# Patient Record
Sex: FEMALE | Race: BLACK OR AFRICAN AMERICAN | NOT HISPANIC OR LATINO | Employment: UNEMPLOYED | URBAN - NONMETROPOLITAN AREA
[De-identification: names, ages, dates, MRNs, and addresses within clinical notes are randomized per-mention and may not be internally consistent; named-entity substitution may affect disease eponyms.]

---

## 2017-01-24 ENCOUNTER — HOSPITAL ENCOUNTER (EMERGENCY)
Facility: HOSPITAL | Age: 42
Discharge: HOME | End: 2017-01-24
Attending: EMERGENCY MEDICINE | Admitting: EMERGENCY MEDICINE

## 2017-01-24 VITALS
HEIGHT: 67 IN | DIASTOLIC BLOOD PRESSURE: 87 MMHG | HEART RATE: 97 BPM | SYSTOLIC BLOOD PRESSURE: 129 MMHG | OXYGEN SATURATION: 97 % | TEMPERATURE: 98 F | BODY MASS INDEX: 30.29 KG/M2 | WEIGHT: 193 LBS | RESPIRATION RATE: 18 BRPM

## 2017-01-24 DIAGNOSIS — G43.009 MIGRAINE WITHOUT AURA AND WITHOUT STATUS MIGRAINOSUS, NOT INTRACTABLE: Primary | ICD-10-CM

## 2017-01-24 PROCEDURE — 96374 THER/PROPH/DIAG INJ IV PUSH: CPT

## 2017-01-24 PROCEDURE — 99284 EMERGENCY DEPT VISIT MOD MDM: CPT

## 2017-01-24 PROCEDURE — 25010000002 METOCLOPRAMIDE PER 10 MG: Performed by: PHYSICIAN ASSISTANT

## 2017-01-24 PROCEDURE — 96361 HYDRATE IV INFUSION ADD-ON: CPT

## 2017-01-24 PROCEDURE — 96375 TX/PRO/DX INJ NEW DRUG ADDON: CPT

## 2017-01-24 PROCEDURE — 25010000002 DIPHENHYDRAMINE PER 50 MG: Performed by: PHYSICIAN ASSISTANT

## 2017-01-24 PROCEDURE — 25010000002 KETOROLAC TROMETHAMINE PER 15 MG: Performed by: PHYSICIAN ASSISTANT

## 2017-01-24 RX ORDER — TOPIRAMATE 50 MG/1
50 TABLET, FILM COATED ORAL 2 TIMES DAILY
COMMUNITY
End: 2018-09-07

## 2017-01-24 RX ORDER — METOCLOPRAMIDE HYDROCHLORIDE 5 MG/ML
10 INJECTION INTRAMUSCULAR; INTRAVENOUS ONCE
Status: COMPLETED | OUTPATIENT
Start: 2017-01-24 | End: 2017-01-24

## 2017-01-24 RX ORDER — GABAPENTIN 600 MG/1
600 TABLET ORAL 3 TIMES DAILY
COMMUNITY
End: 2017-12-30

## 2017-01-24 RX ORDER — LISINOPRIL 5 MG/1
5 TABLET ORAL DAILY
COMMUNITY

## 2017-01-24 RX ORDER — KETOROLAC TROMETHAMINE 30 MG/ML
30 INJECTION, SOLUTION INTRAMUSCULAR; INTRAVENOUS ONCE
Status: COMPLETED | OUTPATIENT
Start: 2017-01-24 | End: 2017-01-24

## 2017-01-24 RX ORDER — BACLOFEN 10 MG/1
10 TABLET ORAL NIGHTLY
COMMUNITY
End: 2019-01-27 | Stop reason: HOSPADM

## 2017-01-24 RX ORDER — DIPHENHYDRAMINE HYDROCHLORIDE 50 MG/ML
25 INJECTION INTRAMUSCULAR; INTRAVENOUS ONCE
Status: COMPLETED | OUTPATIENT
Start: 2017-01-24 | End: 2017-01-24

## 2017-01-24 RX ORDER — IBUPROFEN 600 MG/1
600 TABLET ORAL 3 TIMES DAILY
COMMUNITY
End: 2017-12-30

## 2017-01-24 RX ORDER — SUMATRIPTAN 50 MG/1
50 TABLET, FILM COATED ORAL
COMMUNITY
End: 2018-03-02

## 2017-01-24 RX ORDER — HYDROCHLOROTHIAZIDE 25 MG/1
20 TABLET ORAL DAILY
COMMUNITY

## 2017-01-24 RX ADMIN — METOCLOPRAMIDE 10 MG: 5 INJECTION, SOLUTION INTRAMUSCULAR; INTRAVENOUS at 12:03

## 2017-01-24 RX ADMIN — DIPHENHYDRAMINE HYDROCHLORIDE 25 MG: 50 INJECTION, SOLUTION INTRAMUSCULAR; INTRAVENOUS at 11:59

## 2017-01-24 RX ADMIN — KETOROLAC TROMETHAMINE 30 MG: 30 INJECTION, SOLUTION INTRAMUSCULAR at 12:02

## 2017-01-24 RX ADMIN — SODIUM CHLORIDE 1000 ML: 9 INJECTION, SOLUTION INTRAVENOUS at 11:58

## 2017-01-24 NOTE — ED PROVIDER NOTES
Subjective   HPI Comments: 41-year-old female presents with migraine headache for 2 days.  She has a history of migraine headaches and takes Imitrex with no relief.  She states this is typical of her migraine headaches including the pain, sensitivity to  light and sound.  Some nausea no vomiting.    Patient is a 41 y.o. female presenting with migraines.   History provided by:  Patient   used: No    Migraine   Pain location:  Generalized  Quality:  Dull  Radiates to:  R neck  Severity currently:  7/10  Severity at highest:  7/10  Onset quality:  Gradual  Duration:  2 days  Timing:  Constant  Progression:  Unchanged  Chronicity:  New  Similar to prior headaches: yes    Context: bright light, emotional stress and loud noise    Relieved by:  Nothing  Worsened by:  Light and sound (Imitrex)  Associated symptoms: nausea and photophobia        Review of Systems   Eyes: Positive for photophobia.   Gastrointestinal: Positive for nausea.   All other systems reviewed and are negative.      Past Medical History   Diagnosis Date   • Arthritis    • Fibromyalgia    • Hypertension    • Migraine        No Known Allergies    Past Surgical History   Procedure Laterality Date   • Knee arthroscopy     • Cholecystectomy     • Hysterectomy         Family History   Problem Relation Age of Onset   • Stroke Father    • Diabetes Father    • Diabetes Paternal Grandmother        Social History     Social History   • Marital status:      Spouse name: N/A   • Number of children: N/A   • Years of education: N/A     Social History Main Topics   • Smoking status: Never Smoker   • Smokeless tobacco: None   • Alcohol use No   • Drug use: No   • Sexual activity: Not Asked     Other Topics Concern   • None     Social History Narrative   • None           Objective   Physical Exam   Constitutional: She is oriented to person, place, and time. She appears well-developed and well-nourished.   HENT:   Head: Normocephalic and  atraumatic.   Eyes: Conjunctivae and EOM are normal. Pupils are equal, round, and reactive to light.   Neck: Normal range of motion. Neck supple.   Cardiovascular: Normal rate and regular rhythm.    Pulmonary/Chest: Effort normal and breath sounds normal.   Abdominal: Soft. Bowel sounds are normal.   Musculoskeletal: Normal range of motion.   Neurological: She is alert and oriented to person, place, and time.   Skin: Skin is warm and dry.   Psychiatric: She has a normal mood and affect. Her behavior is normal. Thought content normal.   Nursing note and vitals reviewed.      Procedures         ED Course  ED Course   Comment By Time   Resting comfortably Len Fritz Jr., PA-C 01/24 1237                  [facility]    Final diagnoses:   Migraine without aura and without status migrainosus, not intractable            Len Fritz Jr., PA-C  01/24/17 1322

## 2017-03-27 ENCOUNTER — APPOINTMENT (OUTPATIENT)
Dept: GENERAL RADIOLOGY | Facility: HOSPITAL | Age: 42
End: 2017-03-27

## 2017-03-27 ENCOUNTER — HOSPITAL ENCOUNTER (EMERGENCY)
Facility: HOSPITAL | Age: 42
Discharge: HOME | End: 2017-03-27
Attending: EMERGENCY MEDICINE | Admitting: EMERGENCY MEDICINE

## 2017-03-27 VITALS
RESPIRATION RATE: 18 BRPM | OXYGEN SATURATION: 99 % | WEIGHT: 176 LBS | TEMPERATURE: 97.6 F | BODY MASS INDEX: 27.62 KG/M2 | DIASTOLIC BLOOD PRESSURE: 93 MMHG | SYSTOLIC BLOOD PRESSURE: 143 MMHG | HEIGHT: 67 IN | HEART RATE: 92 BPM
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
## 2017-03-27 DIAGNOSIS — S50.01XA CONTUSION OF RIGHT ELBOW, INITIAL ENCOUNTER: ICD-10-CM
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
## 2017-03-27 PROCEDURE — 73080 X-RAY EXAM OF ELBOW: CPT

## 2017-03-27 PROCEDURE — 73060 X-RAY EXAM OF HUMERUS: CPT

## 2017-03-27 PROCEDURE — 99283 EMERGENCY DEPT VISIT LOW MDM: CPT

## 2017-03-27 RX ORDER — ACETAMINOPHEN 325 MG/1
650 TABLET ORAL ONCE
Status: COMPLETED | OUTPATIENT
Start: 2017-03-27 | End: 2017-03-27

## 2017-03-27 RX ADMIN — ACETAMINOPHEN 650 MG: 325 TABLET, FILM COATED ORAL at 11:51

## 2017-03-27 NOTE — ED PROVIDER NOTES
Subjective   History of Present Illness   41-year-old female presenting with right elbow pain after fall last night.  Patient states that she slipped in the tub and fell hitting her right elbow.  She did hit her head but did not lose consciousness, nor have any subsequent vomiting, memory loss, changes in vision, weakness or numbness in arms or legs or use of blood thinners.  She does also have pain in her lower back but that is unchanged from her chronic pain.  Denies any weakness or numbness in her right arm or hand.  Denies any other specific symptoms.      Review of Systems   Musculoskeletal: Positive for arthralgias and back pain.   All other systems reviewed and are negative.      Past Medical History:   Diagnosis Date   • Arthritis    • Fibromyalgia    • Hypertension    • Migraine        No Known Allergies    Past Surgical History:   Procedure Laterality Date   • CHOLECYSTECTOMY     • HYSTERECTOMY     • KNEE ARTHROSCOPY         Family History   Problem Relation Age of Onset   • Stroke Father    • Diabetes Father    • Diabetes Paternal Grandmother        Social History     Social History   • Marital status:      Spouse name: N/A   • Number of children: N/A   • Years of education: N/A     Social History Main Topics   • Smoking status: Never Smoker   • Smokeless tobacco: None   • Alcohol use No   • Drug use: No   • Sexual activity: Not Asked     Other Topics Concern   • None     Social History Narrative           Objective   Physical Exam   Constitutional: She is oriented to person, place, and time. She appears well-developed and well-nourished. No distress.   HENT:   Head: Normocephalic.   Mouth/Throat: Oropharynx is clear and moist. No oropharyngeal exudate.   Ecchymosis over right lateral neck which patient states is unrelated.   Eyes: Conjunctivae are normal. No scleral icterus.   Neck: Neck supple. No tracheal deviation present.   Cardiovascular: Normal rate, regular rhythm, normal heart sounds and  intact distal pulses.  Exam reveals no gallop and no friction rub.    No murmur heard.  Pulmonary/Chest: Effort normal and breath sounds normal. No stridor. No respiratory distress. She has no wheezes. She has no rales. She exhibits no tenderness.   Abdominal: Soft. She exhibits no distension and no mass. There is no tenderness. There is no rebound and no guarding. No hernia.   Musculoskeletal: She exhibits tenderness. She exhibits no edema or deformity.   Full range of motion about bilateral upper and lower extremities.  Only evidence of trauma to bilateral upper and lower extremities is ecchymosis over the posterior aspect of the distal humerus on the right.   Neurological: She is alert and oriented to person, place, and time.   Able to make thumbs up, a-ok sign, cross fingers without difficulty. Able to flex and extend across all joints and sensation intact to all aspects of hand. CRF < 2 seconds. 2+ radial pulses bilaterally   Skin: Skin is warm and dry. She is not diaphoretic. No erythema. No pallor.   Psychiatric: She has a normal mood and affect. Her behavior is normal.   Nursing note and vitals reviewed.      Procedures         ED Course  ED Course                  MDM   41-year-old female here after fall with chief complaint of right elbow pain.  Afebrile, vital signs stable.  Only evidence of trauma is ecchymosis to posterior, distal right humerus.  Does not meet criteria to require a CT scan of the brain.  Will get x-rays of the elbow and humerus and give Tylenol for pain control.    11:45 AM Xray (my read) shows no acute fracture. Will discharge home w/ recommendations for tylenol for pain control.     Final diagnoses:   Fall at home, initial encounter   Contusion of right elbow, initial encounter            Basil Christian MD  03/27/17 1149

## 2017-12-07 ENCOUNTER — HOSPITAL ENCOUNTER (EMERGENCY)
Facility: HOSPITAL | Age: 42
Discharge: HOME | End: 2017-12-07
Attending: EMERGENCY MEDICINE | Admitting: EMERGENCY MEDICINE

## 2017-12-07 ENCOUNTER — APPOINTMENT (OUTPATIENT)
Dept: GENERAL RADIOLOGY | Facility: HOSPITAL | Age: 42
End: 2017-12-07

## 2017-12-07 VITALS
HEIGHT: 68 IN | HEART RATE: 94 BPM | BODY MASS INDEX: 26.67 KG/M2 | DIASTOLIC BLOOD PRESSURE: 87 MMHG | SYSTOLIC BLOOD PRESSURE: 155 MMHG | OXYGEN SATURATION: 100 % | RESPIRATION RATE: 18 BRPM | WEIGHT: 176 LBS | TEMPERATURE: 98.7 F

## 2017-12-07 DIAGNOSIS — S60.041A CONTUSION OF RIGHT RING FINGER WITHOUT DAMAGE TO NAIL, INITIAL ENCOUNTER: ICD-10-CM

## 2017-12-07 DIAGNOSIS — S60.031A CONTUSION OF RIGHT MIDDLE FINGER WITHOUT DAMAGE TO NAIL, INITIAL ENCOUNTER: Primary | ICD-10-CM

## 2017-12-07 PROCEDURE — 73140 X-RAY EXAM OF FINGER(S): CPT

## 2017-12-07 PROCEDURE — 99283 EMERGENCY DEPT VISIT LOW MDM: CPT

## 2017-12-07 RX ORDER — IBUPROFEN 800 MG/1
800 TABLET ORAL EVERY 8 HOURS PRN
Qty: 21 TABLET | Refills: 0 | Status: SHIPPED | OUTPATIENT
Start: 2017-12-07 | End: 2018-03-02 | Stop reason: ALTCHOICE

## 2017-12-07 RX ORDER — IBUPROFEN 800 MG/1
800 TABLET ORAL ONCE
Status: COMPLETED | OUTPATIENT
Start: 2017-12-07 | End: 2017-12-07

## 2017-12-07 RX ADMIN — IBUPROFEN 800 MG: 800 TABLET, FILM COATED ORAL at 14:09

## 2017-12-07 NOTE — ED PROVIDER NOTES
Subjective   History of Present Illness  42-year-old female presents today with complaints of pain in the third and fourth fingers on her right hand after dropping a pound frozen hamburger on the hand approximately an hour ago.  He states that the meat started falling and she was afraid it was going to drop on her foot so she was moving her foot and slipped and it ended up hitting her hand.  She has not taken anything for pain.  She has not iced it.  She said she put a cold cloth on it did not seem to make it worse.  She does have some tingling at the distal and of the third finger and the main area of swelling appears to be over the PIP on the third finger.  She denies any other injuries.  Denies any chance she could be pregnant.  Review of Systems   All other systems reviewed and are negative.      Past Medical History:   Diagnosis Date   • Arthritis    • Fibromyalgia    • Hypertension    • Migraine        No Known Allergies    Past Surgical History:   Procedure Laterality Date   • CHOLECYSTECTOMY     • HYSTERECTOMY     • KNEE ARTHROSCOPY         Family History   Problem Relation Age of Onset   • Stroke Father    • Diabetes Father    • Diabetes Paternal Grandmother        Social History     Social History   • Marital status:      Spouse name: N/A   • Number of children: N/A   • Years of education: N/A     Social History Main Topics   • Smoking status: Never Smoker   • Smokeless tobacco: None   • Alcohol use No   • Drug use: No   • Sexual activity: Not Asked     Other Topics Concern   • None     Social History Narrative           Objective   Physical Exam   Constitutional: She is oriented to person, place, and time. She appears well-developed and well-nourished.   HENT:   Head: Normocephalic and atraumatic.   Eyes: Conjunctivae and EOM are normal. Pupils are equal, round, and reactive to light.   Neck: Normal range of motion.   Cardiovascular: Normal rate, regular rhythm and normal heart sounds.     Pulmonary/Chest: Effort normal and breath sounds normal.   Musculoskeletal: Normal range of motion.   She has some swelling over the PIP of the third right finger and also some over the fourth.  She seems to have more of the swelling on the right third finger.  She is able to oppose each finger to the thumb and make an a okay with each finger.  She's having some difficulty bending at the PIP on the third and fourth fingers due to swelling and pain.  She has normal sensation although she does verbalize having some tingling at the distal end of the right third finger.  No injury to the actual hand.   Neurological: She is alert and oriented to person, place, and time.   Skin: Skin is warm and dry.   Psychiatric: She has a normal mood and affect. Her behavior is normal. Judgment and thought content normal.   Nursing note and vitals reviewed.      Procedures         ED Course  ED Course   Comment By Time   X-ray of the third and fourth finger on the right hand are negative for any fractures.  I will order Motrin 800 mg 1 every 8 hours with food for the next week.  I gunjan taped the 2 fingers together.  I recommended she elevate them and ice them.  She'll follow-up with her primary care provider if she has no improvement. Gudelia Barcenas, APRN 12/07 5819        We'll give her Motrin 800 mg.  We'll get an x-ray of the third and fourth fingers on the right hand.          MDM    Final diagnoses:   Contusion of right middle finger without damage to nail, initial encounter   Contusion of right ring finger without damage to nail, initial encounter            Gudelia Barcenas, APRN  12/07/17 [id number]

## 2017-12-29 ENCOUNTER — TRANSCRIBE ORDERS (OUTPATIENT)
Dept: ADMINISTRATIVE | Facility: HOSPITAL | Age: 42
End: 2017-12-29

## 2017-12-29 ENCOUNTER — HOSPITAL ENCOUNTER (OUTPATIENT)
Dept: GENERAL RADIOLOGY | Facility: HOSPITAL | Age: 42
Discharge: HOME | End: 2017-12-29
Admitting: NURSE PRACTITIONER

## 2017-12-29 ENCOUNTER — HOSPITAL ENCOUNTER (OUTPATIENT)
Dept: GENERAL RADIOLOGY | Facility: HOSPITAL | Age: 42
Discharge: HOME | End: 2017-12-29

## 2017-12-29 DIAGNOSIS — R52 PAIN: ICD-10-CM

## 2017-12-29 DIAGNOSIS — R52 PAIN: Primary | ICD-10-CM

## 2017-12-29 PROCEDURE — 72050 X-RAY EXAM NECK SPINE 4/5VWS: CPT

## 2017-12-29 PROCEDURE — 73060 X-RAY EXAM OF HUMERUS: CPT

## 2017-12-29 PROCEDURE — 73030 X-RAY EXAM OF SHOULDER: CPT

## 2018-02-09 ENCOUNTER — TRANSCRIBE ORDERS (OUTPATIENT)
Dept: MRI IMAGING | Facility: HOSPITAL | Age: 43
End: 2018-02-09

## 2018-02-09 DIAGNOSIS — M25.561 ACUTE PAIN OF RIGHT KNEE: Primary | ICD-10-CM

## 2018-02-09 DIAGNOSIS — M54.2 NECK PAIN: Primary | ICD-10-CM

## 2018-02-16 ENCOUNTER — HOSPITAL ENCOUNTER (OUTPATIENT)
Dept: MRI IMAGING | Facility: HOSPITAL | Age: 43
Discharge: HOME | End: 2018-02-16

## 2018-02-16 ENCOUNTER — APPOINTMENT (OUTPATIENT)
Dept: MRI IMAGING | Facility: HOSPITAL | Age: 43
End: 2018-02-16

## 2018-02-22 ENCOUNTER — HOSPITAL ENCOUNTER (OUTPATIENT)
Dept: MRI IMAGING | Facility: HOSPITAL | Age: 43
Discharge: HOME | End: 2018-02-22
Admitting: NURSE PRACTITIONER

## 2018-02-22 ENCOUNTER — HOSPITAL ENCOUNTER (OUTPATIENT)
Dept: MRI IMAGING | Facility: HOSPITAL | Age: 43
Discharge: HOME | End: 2018-02-22

## 2018-02-22 DIAGNOSIS — M54.2 NECK PAIN: ICD-10-CM

## 2018-02-22 DIAGNOSIS — M25.561 ACUTE PAIN OF RIGHT KNEE: ICD-10-CM

## 2018-02-22 PROCEDURE — 72141 MRI NECK SPINE W/O DYE: CPT

## 2018-02-22 PROCEDURE — 73721 MRI JNT OF LWR EXTRE W/O DYE: CPT

## 2018-02-28 DIAGNOSIS — M25.561 RIGHT KNEE PAIN, UNSPECIFIED CHRONICITY: Primary | ICD-10-CM

## 2018-03-02 ENCOUNTER — OFFICE VISIT (OUTPATIENT)
Dept: ORTHOPEDIC SURGERY | Facility: CLINIC | Age: 43
End: 2018-03-02

## 2018-03-02 VITALS — HEIGHT: 68 IN | RESPIRATION RATE: 16 BRPM | WEIGHT: 180 LBS | BODY MASS INDEX: 27.28 KG/M2

## 2018-03-02 DIAGNOSIS — R29.6 FREQUENT FALLS: ICD-10-CM

## 2018-03-02 DIAGNOSIS — M25.361 KNEE INSTABILITY, RIGHT: ICD-10-CM

## 2018-03-02 DIAGNOSIS — M22.41 PATELLA, CHONDROMALACIA, RIGHT: Primary | ICD-10-CM

## 2018-03-02 PROCEDURE — 99203 OFFICE O/P NEW LOW 30 MIN: CPT | Performed by: PHYSICIAN ASSISTANT

## 2018-03-02 RX ORDER — SUMATRIPTAN 100 MG/1
TABLET, FILM COATED ORAL
COMMUNITY
Start: 2018-02-19

## 2018-03-02 RX ORDER — AMITRIPTYLINE HYDROCHLORIDE 10 MG/1
TABLET, FILM COATED ORAL
COMMUNITY
Start: 2018-01-22 | End: 2019-01-27 | Stop reason: HOSPADM

## 2018-03-02 RX ORDER — ROPINIROLE 0.5 MG/1
TABLET, FILM COATED ORAL
COMMUNITY
Start: 2018-02-19 | End: 2018-09-07

## 2018-03-02 RX ORDER — ERGOCALCIFEROL 1.25 MG/1
CAPSULE ORAL
COMMUNITY
Start: 2018-03-01 | End: 2018-09-07

## 2018-03-02 RX ORDER — GABAPENTIN 800 MG/1
TABLET ORAL
COMMUNITY
Start: 2018-02-28 | End: 2019-03-06

## 2018-03-02 RX ORDER — MELOXICAM 15 MG/1
15 TABLET ORAL DAILY
Qty: 30 TABLET | Refills: 1 | Status: SHIPPED | OUTPATIENT
Start: 2018-03-02 | End: 2018-03-27

## 2018-03-02 RX ORDER — LANOLIN ALCOHOL/MO/W.PET/CERES
CREAM (GRAM) TOPICAL
COMMUNITY
Start: 2018-01-31 | End: 2019-01-27 | Stop reason: HOSPADM

## 2018-03-02 RX ORDER — DULOXETIN HYDROCHLORIDE 30 MG/1
CAPSULE, DELAYED RELEASE ORAL
COMMUNITY
Start: 2018-01-29 | End: 2018-03-27

## 2018-03-02 RX ORDER — GABAPENTIN 600 MG/1
TABLET ORAL
COMMUNITY
Start: 2018-01-29 | End: 2018-09-07

## 2018-03-02 NOTE — PROGRESS NOTES
"Subjective   Patient ID: Baldo Purcell is a 42 y.o. right hand dominant female is here today for a treatment planning discussion. Pain of the Right Knee and Pain of the Right Hand         History of Present Illness  Patient presents as a new patient with complaints of right knee pain.  She states she's been dealing with right knee pain for many years.  She states her knee wants to \"give out often\".  She states she has frequent falls due to the knee instability.  The most recent fall was yesterday when she landed directly on her right knee.  Prior to that October 2017 she had a fall for the same reason    Patient states her right knee will swell at times.  She also states her lower back does hurt with radiation of pain from the lower back into the lower legs.  Her primary care provider ordered an MRI of the right knee which she brings with her.  Patient also states she had an EMG of her lower extremities approximate 1 year ago for \"fibromyalgia pains\" she states she was told this was within normal limits.    Pain Score: 9  Pain Location: Hand (& Finger)  Pain Orientation: Right     Pain Descriptors: Aching, Throbbing     Pain Onset: Gradual        Aggravating Factors: Stairs, Walking, Standing        Pain Intervention(s): Heat applied, Cold applied, Rest, Medication (See MAR), Home medication, Warm pack  Result of Injury: No       Past Medical History:   Diagnosis Date   • Arthritis    • Bursitis    • Diverticula, colon    • Fibromyalgia    • GERD (gastroesophageal reflux disease)    • Hypertension    • Lumbosacral disc disease    • Migraine    • Osteoarthritis         Past Surgical History:   Procedure Laterality Date   • CHOLECYSTECTOMY     • HYSTERECTOMY     • KNEE ARTHROSCOPY     • KNEE ARTHROSCOPY Right    • TUBAL ABDOMINAL LIGATION     • TUMOR EXCISION         Family History   Problem Relation Age of Onset   • Stroke Father    • Diabetes Father    • Diabetes Paternal Grandmother    • Osteoarthritis " "Other    • Hypertension Other    • Angina Other    • Diabetes Other    • Stroke Other    • Hyperlipidemia Other    • Glaucoma Other        Social History     Social History   • Marital status:      Spouse name: N/A   • Number of children: N/A   • Years of education: N/A     Occupational History   • Not on file.     Social History Main Topics   • Smoking status: Never Smoker   • Smokeless tobacco: Not on file   • Alcohol use No   • Drug use: No   • Sexual activity: Defer     Other Topics Concern   • Not on file     Social History Narrative       No Known Allergies    Review of Systems   Constitutional: Negative for fever.   HENT: Negative for voice change.    Eyes: Negative for visual disturbance.   Respiratory: Negative for shortness of breath.    Cardiovascular: Negative for chest pain.   Gastrointestinal: Negative for abdominal distention and abdominal pain.   Genitourinary: Negative for dysuria.   Musculoskeletal: Positive for arthralgias. Negative for gait problem and joint swelling.   Skin: Positive for wound (bruising to the right knee). Negative for rash.   Neurological: Negative for speech difficulty.   Hematological: Does not bruise/bleed easily.   Psychiatric/Behavioral: Negative for confusion.   All other systems reviewed and are negative.      Objective   Resp 16  Ht 171.5 cm (67.52\")  Wt 81.6 kg (180 lb)  BMI 27.76 kg/m2   Physical Exam   Constitutional: She is oriented to person, place, and time. She appears well-nourished.   Neck: No tracheal deviation present.   Pulmonary/Chest: Effort normal.   Musculoskeletal:        Right knee: She exhibits no swelling, no effusion, no ecchymosis and no erythema. Tenderness found. Medial joint line and lateral joint line tenderness noted.        Lumbar back: She exhibits tenderness.   Neurological: She is alert and oriented to person, place, and time.   Psychiatric: Her behavior is normal.   Vitals reviewed.    Right Knee Exam     Range of Motion "   Right knee extension: 2.   Flexion: 120     Tests   Saba:  Medial - negative Lateral - negative  Drawer:       Anterior - negative    Posterior - negative  Varus: negative  Valgus: negative  Patellar Apprehension: 1+    Other   Erythema: absent  Sensation: normal  Pulse: present  Swelling: none  Other tests: no effusion present      Back Exam     Muscle Strength   Right Quadriceps:  4/5   Right Hamstrings:  4/5            Extremity DVT signs are Negative by clinical screen. and Negative on physical exam with negative Hattie sign, with no calf pain, with no palpable cords, with no increased pain with passive stretch/extension and with no skin tone change   Neurologic Exam     Mental Status   Oriented to person, place, and time.     Motor Exam     Strength   Right quadriceps: 4/5  Right hamstrin/5     Right Knee Exam     Tenderness   The patient is experiencing tenderness in the medial joint line, lateral joint line.         Patient does have several small superficial bruises to the right knee.  Negative patella tap test.  Patient does have significant patellar crepitus to the right knee.    Assessment/Plan   Independent Review of Radiographic Studies:    No new imaging done today.    I did review an MRI of the right knee.  There is significant patellofemoral chondromalacia  Procedures       Baldo was seen today for pain and pain.    Diagnoses and all orders for this visit:    Patella, chondromalacia, right  -     Ambulatory Referral to Physical Therapy Evaluate and treat, Ortho    Knee instability, right  -     Ambulatory Referral to Physical Therapy Evaluate and treat, Ortho    Frequent falls  -     Ambulatory Referral to Physical Therapy Evaluate and treat, Ortho    Other orders  -     meloxicam (MOBIC) 15 MG tablet; Take 1 tablet by mouth Daily.       Orthopedic activities reviewed and patient expressed appreciation  Discussion of orthopedic goals  Risk, benefits, and merits of treatment alternatives  reviewed with the patient and questions answered  Physical therapy referral given  Use brace as instructed  Ice, heat, and/or modalities as beneficial    Recommendations/Plan:  Exercise, medications, injections, other patient advice, and return appointment as noted.  Patient is encouraged to call or return for any issues or concerns.  Enroll in formal physical therapy for quad and VMO strengthening.  I did order a walker for the patient due to the knee instability and due to her recent fall-I would like to have her follow partial weightbearing to the right knee.  Patient was also provided a patella maltracking knee brace to be worn once the bruises have healed.  FU after 3 months of PT    Patient agreeable to call or return sooner for any concerns.

## 2018-03-12 ENCOUNTER — TREATMENT (OUTPATIENT)
Dept: PHYSICAL THERAPY | Facility: CLINIC | Age: 43
End: 2018-03-12

## 2018-03-12 DIAGNOSIS — M25.561 ACUTE PAIN OF RIGHT KNEE: Primary | ICD-10-CM

## 2018-03-12 PROCEDURE — 97161 PT EVAL LOW COMPLEX 20 MIN: CPT | Performed by: PHYSICAL THERAPIST

## 2018-03-12 NOTE — PROGRESS NOTES
Physical Therapy Initial Evaluation and Plan of Care      Patient: Baldo Purcell   : 1975  Diagnosis/ICD-10 Code:  No primary diagnosis found.  Referring practitioner: SHITAL Louise*    Subjective Evaluation    History of Present Illness  Mechanism of injury: R knee arthroscopy about 13 years ago.      Pt felt like the surgery helped for about 6 months.      Recently (6 months) the knee began to grind and crunch really bad.   She states the knee cap goes out and she reduces it herself.      Trying to get disability       Patient Occupation: trying to get disability.  Pain  Current pain ratin  At best pain ratin  At worst pain rating: 10  Location: retro patellar area.   Quality: sharp and grinding  Aggravating factors: ambulation, stairs, repetitive movement and standing  Progression: worsening    Treatments  Previous treatment: physical therapy (14 years ago. )           Objective       Palpation     Additional Palpation Details  MM tightness noted in the HS and quad.     Tenderness     Right Knee   Tenderness in the inferior patella, lateral patella, medial patella and superior patella.     Active Range of Motion     Right Knee   Flexion: 131 (pain in the knee 8/10) degrees with pain  Extension: 3 degrees with pain    Additional Active Range of Motion Details  Large amount of crepitus noted in the R knee.     Patellar Mobility     Right Knee Hypomobile in the medial, lateral, superior and inferior patellar tendon(s).     Additional Patellar Mobility Details  MM guarding limiting mobility.  Pain also present with testing.       Strength/Myotome Testing     Left Knee   Flexion: 4  Prone flexion: 4  Quadriceps contraction: good    Right Knee   Flexion: 3  Prone flexion: 2+  Quadriceps contraction: poor         Assessment & Plan     Assessment  Impairments: abnormal gait, abnormal muscle tone, abnormal or restricted ROM, activity intolerance, impaired balance, impaired physical  strength, lacks appropriate home exercise program and pain with function  Assessment details: Patient is a 42 year old female who comes to physical therapy with R knee pain. Signs and symptoms are consistent with R patellar dysfunction.  The patient currently has pain, decreased ROM, decreased strength, and inability to perform all essential functional activities. Pt will benefit from skilled PT services to address the above issues.     Prognosis: fair  Prognosis details:   SHORT TERM GOALS:  2 weeks       1. Pt independent with HEP  2. Pt to demonstrate danni hip strength 4/5 or greater to improve stability with ambulation  3. Pt to report being able to walk for 10 minutes without increasing pain in the right knee    LONG TERM GOALS:   6 weeks  1. Pt to demonstrate ability to perform 1/2 functional squat with good form and control of the knees and without increasing pain  2. Pt to demonstrate danni hip strength to 4+/5 or greater to improve safety with ambulation on uneven surfaces  3. Pt to return to work full duty without increased pain in the right knee   4. Pt to demonstrate ability to perform step up/down 8 inch step x10 safely and without pain in the right knee   Functional Limitations: carrying objects, walking, uncomfortable because of pain, standing and unable to perform repetitive tasks  Plan  Therapy options: will be seen for skilled physical therapy services  Planned modality interventions: cryotherapy, electrical stimulation/Russian stimulation, thermotherapy (hydrocollator packs) and ultrasound  Planned therapy interventions: stretching, strengthening, soft tissue mobilization, manual therapy, motor coordination training, neuromuscular re-education, ADL retraining, balance/weight-bearing training, flexibility, functional ROM exercises, gait training and home exercise program  Treatment plan discussed with: patient  Plan details: Pt will be seen 1 times per week for skilled PT.         Manual Therapy:          mins  07019;  Therapeutic Exercise:    25nc     mins  78692;     Neuromuscular Sedrick:        mins  04360;    Therapeutic Activity:          mins  44506;     Gait Training:           mins  34268;     Ultrasound:          mins  83443;    Electrical Stimulation:         mins  36697 ( );  Dry Needling          mins self-pay    Timed Treatment:   25nc   mins   Total Treatment:     50   mins    PT SIGNATURE: Jax Gibson, PT   DATE TREATMENT INITIATED: 3/12/2018    Initial Certification  Certification Period: 6/10/2018  I certify that the therapy services are furnished while this patient is under my care.  The services outlined above are required by this patient, and will be reviewed every 90 days.     PHYSICIAN: Coleman Cardenas PA-C      DATE:     Please sign and return via fax to  .. Thank you, [facility].

## 2018-03-27 ENCOUNTER — OFFICE VISIT (OUTPATIENT)
Dept: NEUROSURGERY | Facility: CLINIC | Age: 43
End: 2018-03-27

## 2018-03-27 VITALS — WEIGHT: 180 LBS | BODY MASS INDEX: 27.28 KG/M2 | HEIGHT: 68 IN

## 2018-03-27 DIAGNOSIS — M50.30 BULGING OF CERVICAL INTERVERTEBRAL DISC: Primary | ICD-10-CM

## 2018-03-27 PROCEDURE — 99243 OFF/OP CNSLTJ NEW/EST LOW 30: CPT | Performed by: NEUROLOGICAL SURGERY

## 2018-03-27 NOTE — PROGRESS NOTES
Subjective   Patient ID: Baldo Purcell is a 42 y.o. female is being seen for consultation today at the request of MÓNICA Pascual  Chief Complaint: Neck and left arm pain    History of Present Illness: The patient is a 42-year-old woman from [facility] with a history of pain in her neck that radiates to her left shoulder and arm and has been present for about the past 6 months.  Pain radiates to her middle and ring finger on the left arm, as well as affecting the left triceps region and elbow region as well as extensor forearm surface.  She has been undergoing physical therapy for her lower extremity, knee, but none on her cervical spine.  She has a history of fibromyalgia and has not been able to work.  She has 2 children at home and 5 stepchildren with her boyfriend.    Review of Radiographic Studies:  Cervical MRI scan on 2/22/18 shows a left sided cervical disc herniation at C6-7 and at C7-T1.  There is a right-sided disc bulge at C5-6.    The following portions of the patient's history were reviewed, updated as appropriate and approved: allergies, current medications, past family history, past medical history, past social history, past surgical history, review of systems and problem list.  Review of Systems   Constitutional: Negative for activity change, appetite change, chills, diaphoresis, fatigue, fever and unexpected weight change.   HENT: Negative for congestion, dental problem, drooling, ear discharge, ear pain, facial swelling, hearing loss, mouth sores, nosebleeds, postnasal drip, rhinorrhea, sinus pressure, sneezing, sore throat, tinnitus, trouble swallowing and voice change.    Eyes: Negative for photophobia, pain, discharge, redness, itching and visual disturbance.   Respiratory: Negative for apnea, cough, choking, chest tightness, shortness of breath, wheezing and stridor.    Cardiovascular: Negative for chest pain, palpitations and leg swelling.   Gastrointestinal: Negative  for abdominal distention, abdominal pain, anal bleeding, blood in stool, constipation, diarrhea, nausea, rectal pain and vomiting.   Endocrine: Negative for cold intolerance, heat intolerance, polydipsia, polyphagia and polyuria.   Genitourinary: Negative for decreased urine volume, difficulty urinating, dysuria, enuresis, flank pain, frequency, genital sores, hematuria and urgency.   Musculoskeletal: Positive for arthralgias, back pain, joint swelling, myalgias, neck pain and neck stiffness. Negative for gait problem.   Skin: Negative for color change, pallor, rash and wound.   Allergic/Immunologic: Negative for environmental allergies, food allergies and immunocompromised state.   Neurological: Positive for weakness and numbness. Negative for dizziness, tremors, seizures, syncope, facial asymmetry, speech difficulty, light-headedness and headaches.   Hematological: Negative for adenopathy. Does not bruise/bleed easily.   Psychiatric/Behavioral: Negative for agitation, behavioral problems, confusion, decreased concentration, dysphoric mood, hallucinations, self-injury, sleep disturbance and suicidal ideas. The patient is not nervous/anxious and is not hyperactive.        Objective     NEUROLOGICAL EXAMINATION:      MENTAL STATUS:  Alert and oriented.  Speech intact.  Recent and remote memory intact.      CRANIAL NERVES:  Cranial nerve II:  Visual fields are full.  Cranial nerves III, IV and VI:  PERRLADC.  Extraocular movements are intact.  Nystagmus is not present.  Cranial nerve V:  Facial sensation is intact.  Cranial nerve VII:  Muscles of facial expression reveal no asymmetry.  Cranial nerve VIII:  Hearing is intact.  Cranial nerves IX and X:  Palate elevates symmetrically.  Cranial nerve XI:  Shoulder shrug is intact.  Cranial nerve XII:  Tongue is midline without evidence of atrophy or fasciculation.    MUSCULOSKELETAL:  Cervical range of motion intact.    MOTOR:  No focal weakness of deltoid, biceps,  triceps, or  on either side.  No atrophy of the hand.    SENSATION: Intermittent numbness left middle and ring finger.    REFLEXES:  DTR trace symmetrically in biceps and triceps bilaterally.    Assessment   Left cervical radiculopathy; left C6-7 and left C7-T1 cervical disc herniation.       Plan   Physical therapy.  Follow-up in 6 weeks.       Andrea Pathak MD

## 2018-03-28 RX ORDER — MELOXICAM 15 MG/1
15 TABLET ORAL DAILY
Qty: 30 TABLET | Refills: 1 | OUTPATIENT
Start: 2018-03-28

## 2018-04-02 ENCOUNTER — TELEPHONE (OUTPATIENT)
Dept: NEUROSURGERY | Facility: CLINIC | Age: 43
End: 2018-04-02

## 2018-04-02 RX ORDER — METHYLPREDNISOLONE 4 MG/1
TABLET ORAL
Qty: 1 EACH | Refills: 0 | Status: SHIPPED | OUTPATIENT
Start: 2018-04-02 | End: 2018-09-07

## 2018-04-02 NOTE — TELEPHONE ENCOUNTER
Her neck issues are not likely causing the pain into her buttock and leg. She needs to proceed with PT. I sent a medrol dosepak to her pharmacy to help her symptoms in the interim. If pain dramatically worsens, call us back

## 2018-04-02 NOTE — TELEPHONE ENCOUNTER
Provider:  Lawson     Surgery:  N/A   Surgery Date:    Last visit:   03/27/18 DX Cervical HNP C6-7,C7-T1 LEFT   Next visit: f/u 6wks after PT     PRATEEK:         Reason for call:  Pt LVM- Returned pt call.    -pt states she is having a very hard time with the achy, burning pain she is having in her LEFT side neck, radiating down to the LEFT shoulder blade, states for the last few days it radiating down to her buttocks, Left side straight down the posterior thigh, knee and calf all the way to her LEFT heel.     -pt takes gabapentin 800mg TID with no relief         -please advise?

## 2018-04-04 NOTE — TELEPHONE ENCOUNTER
Pt called back and left msg that the steroid is not helping.  She is still having pain down the whole left side to the toes and can barely move her shoulder.  Is there anything else we can do?

## 2018-04-16 ENCOUNTER — TREATMENT (OUTPATIENT)
Dept: PHYSICAL THERAPY | Facility: CLINIC | Age: 43
End: 2018-04-16

## 2018-04-16 DIAGNOSIS — M25.561 ACUTE PAIN OF RIGHT KNEE: Primary | ICD-10-CM

## 2018-04-16 PROCEDURE — 97140 MANUAL THERAPY 1/> REGIONS: CPT | Performed by: PHYSICAL THERAPIST

## 2018-04-16 PROCEDURE — 97530 THERAPEUTIC ACTIVITIES: CPT | Performed by: PHYSICAL THERAPIST

## 2018-04-16 PROCEDURE — 97110 THERAPEUTIC EXERCISES: CPT | Performed by: PHYSICAL THERAPIST

## 2018-04-16 NOTE — PROGRESS NOTES
Physical Therapy Daily Progress Note      Visit # : 2    Baldo Purcell reports 7/10 pain today at rest.  Pt reports the pain is under the knee cap.  Pt reports she has been doing her HEP and they are painful.     Pt reports she feels about the same as her first visit.       Objective Pt present to PT today with no distress noted at rest.     Pt with 0/10 pain at rest in neutral position.       See Exercise, Manual, and Modality Logs for complete treatment.     Assessment/Plan  Pt with PF pain less at rest and she was able to get some relief with IT band STM.        Progress per Plan of Care  One time per week.  Progress HEP.            Manual Therapy:    10     mins  05682;  Therapeutic Exercise:    32     mins  97754;     Neuromuscular Sedrick:        mins  07623;    Therapeutic Activity:     12     mins  92923;     Gait Training:        ___  mins  51986;     Ultrasound:          mins  26554;    Electrical Stimulation:         mins  39639 ( );  Dry Needling          mins self-pay    Timed Treatment:   54   mins   Total Treatment:     56   mins    Jax Gibson, PT  Physical Therapist

## 2018-07-17 DIAGNOSIS — M17.12 PRIMARY OSTEOARTHRITIS OF LEFT KNEE: Primary | ICD-10-CM

## 2018-07-17 RX ORDER — HYALURONATE SODIUM 10 MG/ML
20 SYRINGE (ML) INTRAARTICULAR WEEKLY
Qty: 6 ML | Refills: 0 | Status: SHIPPED | OUTPATIENT
Start: 2018-07-17 | End: 2018-07-18 | Stop reason: CLARIF

## 2018-07-18 DIAGNOSIS — M17.12 LOCALIZED OSTEOARTHRITIS OF LEFT KNEE: Primary | ICD-10-CM

## 2018-07-18 DIAGNOSIS — M17.12 LOCALIZED OSTEOARTHRITIS OF LEFT KNEE: ICD-10-CM

## 2018-07-18 DIAGNOSIS — M17.11 PRIMARY OSTEOARTHRITIS OF RIGHT KNEE: Primary | ICD-10-CM

## 2018-07-31 ENCOUNTER — TRANSCRIBE ORDERS (OUTPATIENT)
Dept: ADMINISTRATIVE | Facility: HOSPITAL | Age: 43
End: 2018-07-31

## 2018-07-31 ENCOUNTER — HOSPITAL ENCOUNTER (OUTPATIENT)
Dept: GENERAL RADIOLOGY | Facility: HOSPITAL | Age: 43
Discharge: HOME | End: 2018-07-31

## 2018-07-31 ENCOUNTER — HOSPITAL ENCOUNTER (OUTPATIENT)
Dept: CARDIOLOGY | Facility: HOSPITAL | Age: 43
Discharge: HOME | End: 2018-07-31
Admitting: NURSE PRACTITIONER

## 2018-07-31 ENCOUNTER — LAB (OUTPATIENT)
Dept: LAB | Facility: HOSPITAL | Age: 43
End: 2018-07-31

## 2018-07-31 DIAGNOSIS — Z01.818 PRE-OPERATIVE CLEARANCE: ICD-10-CM

## 2018-07-31 DIAGNOSIS — Z01.818 PRE-OPERATIVE CLEARANCE: Primary | ICD-10-CM

## 2018-07-31 LAB
ALBUMIN SERPL-MCNC: 4.1 G/DL (ref 3.5–5)
ALBUMIN/GLOB SERPL: 1.4 G/DL (ref 1–2)
ALP SERPL-CCNC: 82 U/L (ref 38–126)
ALT SERPL W P-5'-P-CCNC: 35 U/L (ref 13–69)
ANION GAP SERPL CALCULATED.3IONS-SCNC: 13.1 MMOL/L (ref 10–20)
AST SERPL-CCNC: 27 U/L (ref 15–46)
BASOPHILS # BLD AUTO: 0.04 10*3/MM3 (ref 0–0.2)
BASOPHILS NFR BLD AUTO: 0.6 % (ref 0–2.5)
BILIRUB SERPL-MCNC: 0.8 MG/DL (ref 0.2–1.3)
BUN BLD-MCNC: 11 MG/DL (ref 7–20)
BUN/CREAT SERPL: 15.7 (ref 7.1–23.5)
CALCIUM SPEC-SCNC: 9.2 MG/DL (ref 8.4–10.2)
CHLORIDE SERPL-SCNC: 103 MMOL/L (ref 98–107)
CHOLEST SERPL-MCNC: 190 MG/DL (ref 0–199)
CO2 SERPL-SCNC: 26 MMOL/L (ref 26–30)
CREAT BLD-MCNC: 0.7 MG/DL (ref 0.6–1.3)
DEPRECATED RDW RBC AUTO: 41.3 FL (ref 37–54)
EOSINOPHIL # BLD AUTO: 0.03 10*3/MM3 (ref 0–0.7)
EOSINOPHIL NFR BLD AUTO: 0.4 % (ref 0–7)
ERYTHROCYTE [DISTWIDTH] IN BLOOD BY AUTOMATED COUNT: 12.7 % (ref 11.5–14.5)
GFR SERPL CREATININE-BSD FRML MDRD: 112 ML/MIN/1.73
GLOBULIN UR ELPH-MCNC: 2.9 GM/DL
GLUCOSE BLD-MCNC: 102 MG/DL (ref 74–98)
HCT VFR BLD AUTO: 44.2 % (ref 37–47)
HDLC SERPL-MCNC: 50 MG/DL (ref 40–60)
HGB BLD-MCNC: 14.9 G/DL (ref 12–16)
IMM GRANULOCYTES # BLD: 0.02 10*3/MM3 (ref 0–0.06)
IMM GRANULOCYTES NFR BLD: 0.3 % (ref 0–0.6)
LDLC SERPL CALC-MCNC: 117 MG/DL (ref 0–99)
LDLC/HDLC SERPL: 2.34 {RATIO}
LYMPHOCYTES # BLD AUTO: 1.52 10*3/MM3 (ref 0.6–3.4)
LYMPHOCYTES NFR BLD AUTO: 22.4 % (ref 10–50)
MCH RBC QN AUTO: 29.9 PG (ref 27–31)
MCHC RBC AUTO-ENTMCNC: 33.7 G/DL (ref 30–37)
MCV RBC AUTO: 88.8 FL (ref 81–99)
MONOCYTES # BLD AUTO: 0.38 10*3/MM3 (ref 0–0.9)
MONOCYTES NFR BLD AUTO: 5.6 % (ref 0–12)
NEUTROPHILS # BLD AUTO: 4.81 10*3/MM3 (ref 2–6.9)
NEUTROPHILS NFR BLD AUTO: 70.7 % (ref 37–80)
NRBC BLD MANUAL-RTO: 0 /100 WBC (ref 0–0)
PLATELET # BLD AUTO: 266 10*3/MM3 (ref 130–400)
PMV BLD AUTO: 9.1 FL (ref 6–12)
POTASSIUM BLD-SCNC: 4.1 MMOL/L (ref 3.5–5.1)
PROT SERPL-MCNC: 7 G/DL (ref 6.3–8.2)
RBC # BLD AUTO: 4.98 10*6/MM3 (ref 4.2–5.4)
SODIUM BLD-SCNC: 138 MMOL/L (ref 137–145)
TRIGL SERPL-MCNC: 115 MG/DL
TSH SERPL DL<=0.05 MIU/L-ACNC: 1.46 MIU/ML (ref 0.47–4.68)
VLDLC SERPL-MCNC: 23 MG/DL
WBC NRBC COR # BLD: 6.8 10*3/MM3 (ref 4.8–10.8)

## 2018-07-31 PROCEDURE — 80061 LIPID PANEL: CPT

## 2018-07-31 PROCEDURE — 36415 COLL VENOUS BLD VENIPUNCTURE: CPT

## 2018-07-31 PROCEDURE — 71046 X-RAY EXAM CHEST 2 VIEWS: CPT

## 2018-07-31 PROCEDURE — 84443 ASSAY THYROID STIM HORMONE: CPT

## 2018-07-31 PROCEDURE — 85025 COMPLETE CBC W/AUTO DIFF WBC: CPT

## 2018-07-31 PROCEDURE — 93005 ELECTROCARDIOGRAM TRACING: CPT | Performed by: NURSE PRACTITIONER

## 2018-07-31 PROCEDURE — 80053 COMPREHEN METABOLIC PANEL: CPT

## 2018-09-05 ENCOUNTER — HOSPITAL ENCOUNTER (EMERGENCY)
Facility: HOSPITAL | Age: 43
Discharge: HOME | End: 2018-09-05
Attending: EMERGENCY MEDICINE | Admitting: EMERGENCY MEDICINE

## 2018-09-05 ENCOUNTER — APPOINTMENT (OUTPATIENT)
Dept: CT IMAGING | Facility: HOSPITAL | Age: 43
End: 2018-09-05

## 2018-09-05 ENCOUNTER — APPOINTMENT (OUTPATIENT)
Dept: CT IMAGING | Facility: HOSPITAL | Age: 43
End: 2018-09-05
Attending: EMERGENCY MEDICINE

## 2018-09-05 ENCOUNTER — APPOINTMENT (OUTPATIENT)
Dept: GENERAL RADIOLOGY | Facility: HOSPITAL | Age: 43
End: 2018-09-05
Attending: EMERGENCY MEDICINE

## 2018-09-05 VITALS
WEIGHT: 171.4 LBS | TEMPERATURE: 98 F | HEART RATE: 95 BPM | BODY MASS INDEX: 26.9 KG/M2 | HEIGHT: 67 IN | SYSTOLIC BLOOD PRESSURE: 140 MMHG | OXYGEN SATURATION: 98 % | RESPIRATION RATE: 16 BRPM | DIASTOLIC BLOOD PRESSURE: 82 MMHG

## 2018-09-05 DIAGNOSIS — S92.302A MULTIPLE CLOSED FRACTURES OF METATARSAL BONE OF LEFT FOOT, INITIAL ENCOUNTER: Primary | ICD-10-CM

## 2018-09-05 PROCEDURE — 25010000002 MORPHINE (PF) 10 MG/ML SOLUTION: Performed by: EMERGENCY MEDICINE

## 2018-09-05 PROCEDURE — 73552 X-RAY EXAM OF FEMUR 2/>: CPT

## 2018-09-05 PROCEDURE — 73630 X-RAY EXAM OF FOOT: CPT

## 2018-09-05 PROCEDURE — 96372 THER/PROPH/DIAG INJ SC/IM: CPT

## 2018-09-05 PROCEDURE — 70450 CT HEAD/BRAIN W/O DYE: CPT

## 2018-09-05 PROCEDURE — 73502 X-RAY EXAM HIP UNI 2-3 VIEWS: CPT

## 2018-09-05 PROCEDURE — 99283 EMERGENCY DEPT VISIT LOW MDM: CPT

## 2018-09-05 PROCEDURE — 72128 CT CHEST SPINE W/O DYE: CPT

## 2018-09-05 PROCEDURE — 73610 X-RAY EXAM OF ANKLE: CPT

## 2018-09-05 PROCEDURE — 25010000002 MORPHINE PER 10 MG: Performed by: EMERGENCY MEDICINE

## 2018-09-05 PROCEDURE — 72125 CT NECK SPINE W/O DYE: CPT

## 2018-09-05 RX ORDER — OXYCODONE AND ACETAMINOPHEN 10; 325 MG/1; MG/1
1 TABLET ORAL EVERY 6 HOURS PRN
COMMUNITY
End: 2018-09-07

## 2018-09-05 RX ORDER — ONDANSETRON 4 MG/1
4 TABLET, ORALLY DISINTEGRATING ORAL EVERY 6 HOURS PRN
Qty: 12 TABLET | Refills: 0 | Status: SHIPPED | OUTPATIENT
Start: 2018-09-05 | End: 2018-09-07

## 2018-09-05 RX ORDER — OXYCODONE HYDROCHLORIDE AND ACETAMINOPHEN 5; 325 MG/1; MG/1
1 TABLET ORAL EVERY 4 HOURS PRN
Qty: 12 TABLET | Refills: 0 | Status: SHIPPED | OUTPATIENT
Start: 2018-09-05 | End: 2019-03-06

## 2018-09-05 RX ORDER — MORPHINE SULFATE 2 MG/ML
10 INJECTION, SOLUTION INTRAMUSCULAR; INTRAVENOUS ONCE
Status: DISCONTINUED | OUTPATIENT
Start: 2018-09-05 | End: 2018-09-05 | Stop reason: SDUPTHER

## 2018-09-05 RX ORDER — MORPHINE SULFATE 10 MG/ML
10 INJECTION, SOLUTION INTRAMUSCULAR; INTRAVENOUS ONCE
Status: DISCONTINUED | OUTPATIENT
Start: 2018-09-05 | End: 2018-09-05 | Stop reason: SDUPTHER

## 2018-09-05 RX ORDER — MORPHINE SULFATE 4 MG/ML
10 INJECTION, SOLUTION INTRAMUSCULAR; INTRAVENOUS ONCE
Status: COMPLETED | OUTPATIENT
Start: 2018-09-05 | End: 2018-09-05

## 2018-09-05 RX ADMIN — MORPHINE SULFATE 10 MG: 4 INJECTION, SOLUTION INTRAMUSCULAR; INTRAVENOUS at 10:00

## 2018-09-05 NOTE — ED PROVIDER NOTES
TRIAGE CHIEF COMPLAINT:     Nursing and triage notes reviewed    Chief Complaint   Patient presents with   • Fall      HPI: Baldo Purcell is a 42 y.o. female who presents to the emergency department complaining of a fall.  Patient recently had spinal surgery on the 30th of last month on her upper back.  Patient states that she accidentally tripped on a fan cord yesterday evening and fell over a banister approximately 4 feet to the ground.  Patient denies losing consciousness.  Patient arrives complaining of pain in her left foot, left thigh, neck, and upper back.  Patient denies any chest pain or shortness of breath.  Patient states that she did not believe that her pain medicine was adequately controlling her pain.  She spoke to the surgeon who performed her back surgery who reportedly told her to come to the emergency department to get pain medication.    REVIEW OF SYSTEMS: All other systems reviewed and are negative     PAST MEDICAL HISTORY:   Past Medical History:   Diagnosis Date   • Anxiety    • Arthritis    • Bursitis    • Depression    • Diverticula, colon    • Fibromyalgia    • GERD (gastroesophageal reflux disease)    • Hypertension    • Lumbosacral disc disease    • Migraine    • Osteoarthritis         FAMILY HISTORY:   Family History   Problem Relation Age of Onset   • Stroke Father    • Diabetes Father    • Diabetes Paternal Grandmother    • Osteoarthritis Other    • Hypertension Other    • Angina Other    • Diabetes Other    • Stroke Other    • Hyperlipidemia Other    • Glaucoma Other         SOCIAL HISTORY:   Social History     Social History   • Marital status:      Spouse name: N/A   • Number of children: N/A   • Years of education: N/A     Occupational History   • Not on file.     Social History Main Topics   • Smoking status: Never Smoker   • Smokeless tobacco: Not on file   • Alcohol use No   • Drug use: No   • Sexual activity: Defer     Other Topics Concern   • Not on file      Social History Narrative   • No narrative on file        SURGICAL HISTORY:   Past Surgical History:   Procedure Laterality Date   • CERVICAL DISC SURGERY     • CHOLECYSTECTOMY     • HYSTERECTOMY     • KNEE ARTHROSCOPY     • KNEE ARTHROSCOPY Right    • TUBAL ABDOMINAL LIGATION     • TUMOR EXCISION          CURRENT MEDICATIONS:      Medication List      ASK your doctor about these medications    amitriptyline 10 MG tablet  Commonly known as:  ELAVIL     baclofen 10 MG tablet  Commonly known as:  LIORESAL     * gabapentin 600 MG tablet  Commonly known as:  NEURONTIN     * gabapentin 800 MG tablet  Commonly known as:  NEURONTIN     hydrochlorothiazide 25 MG tablet  Commonly known as:  HYDRODIURIL     lisinopril 5 MG tablet  Commonly known as:  PRINIVIL,ZESTRIL     Magnesium Oxide 400 (240 Mg) MG tablet     MethylPREDNISolone 4 MG tablet  Commonly known as:  MEDROL (DEBRA)  Take as directed on package instructions.     oxyCODONE-acetaminophen  MG per tablet  Commonly known as:  PERCOCET     rOPINIRole 0.5 MG tablet  Commonly known as:  REQUIP     SUMAtriptan 100 MG tablet  Commonly known as:  IMITREX     topiramate 50 MG tablet  Commonly known as:  TOPAMAX     vitamin D 64626 units capsule capsule  Commonly known as:  ERGOCALCIFEROL        * This list has 2 medication(s) that are the same as other medications   prescribed for you. Read the directions carefully, and ask your doctor or   other care provider to review them with you.               ALLERGIES: Lortab [hydrocodone-acetaminophen]; Mobic [meloxicam]; and Cymbalta [duloxetine hcl]     PHYSICAL EXAM:   VITAL SIGNS:   Vitals:    09/05/18 0900   BP: 145/89   Pulse: 98   Resp: 16   Temp: 98 °F (36.7 °C)   SpO2: 98%      CONSTITUTIONAL: Awake, oriented, appears non-toxic   HENT: Atraumatic, normocephalic, oral mucosa pink and moist, airway patent.  EYES: Conjunctiva clear, EOMI, PERRL   NECK: Trachea midline, tenderness in the cervical spine, no obvious  step-off or deformity noted.  BACK: Mild tenderness along the thoracic spine, no obvious step-off or deformity.   CARDIOVASCULAR: Normal heart rate, Normal rhythm, No murmurs, rubs, gallops   PULMONARY/CHEST: Clear to auscultation, no rhonchi, wheezes, or rales. Symmetrical breath sounds.  ABDOMINAL: Non-distended, soft, non-tender - no rebound or guarding.  NEUROLOGIC: Non-focal, moving all four extremities, no gross sensory or motor deficits.   EXTREMITIES: No clubbing, cyanosis.  Is swelling and bruising over the left ankle.  There are some abrasions and bruising on the left mid thigh.   SKIN: Warm, Dry, and some abrasions on the left lateral fine as well as the left elbow.     ED COURSE / MEDICAL DECISION MAKING:   Baldo Purcell is a 42 y.o. female who presents to the emergency department for evaluation of the pain following a mechanical fall.  Patient does have tenderness in the cervical and thoracic spine on examination.  There is also no new bruising over the left ankle and foot.  We will obtain imaging studies for further evaluation.  Imaging studies of the head, neck, back, hip, thigh were unremarkable per radiology interpretation.  X-rays of the foot did reveal fractures of the first and fourth metatarsals proximally.  Place patient in a boot and nonweightbearing.  We will continue to treat symptomatically with outpatient follow-up.    DECISION TO DISCHARGE/ADMIT: see ED care timeline     FINAL IMPRESSION:   1 -- fall   2 -- metatarsal fractures  3 --     Electronically signed by: Phoebe Dave MD, 9/5/2018 10:44 AM       Phoebe Dave MD  09/05/18 3750

## 2018-09-07 ENCOUNTER — OFFICE VISIT (OUTPATIENT)
Dept: ORTHOPEDIC SURGERY | Facility: CLINIC | Age: 43
End: 2018-09-07

## 2018-09-07 VITALS — WEIGHT: 176 LBS | HEIGHT: 68 IN | RESPIRATION RATE: 18 BRPM | BODY MASS INDEX: 26.67 KG/M2

## 2018-09-07 DIAGNOSIS — S92.315A CLOSED NONDISPLACED FRACTURE OF FIRST METATARSAL BONE OF LEFT FOOT, INITIAL ENCOUNTER: Primary | ICD-10-CM

## 2018-09-07 PROCEDURE — 99204 OFFICE O/P NEW MOD 45 MIN: CPT | Performed by: ORTHOPAEDIC SURGERY

## 2018-09-07 RX ORDER — OMEPRAZOLE 20 MG/1
20 CAPSULE, DELAYED RELEASE ORAL 2 TIMES DAILY
Refills: 2 | COMMUNITY
Start: 2018-08-15

## 2018-09-07 RX ORDER — PRAMIPEXOLE DIHYDROCHLORIDE 0.5 MG/1
0.5 TABLET ORAL 3 TIMES DAILY
Refills: 2 | COMMUNITY
Start: 2018-08-15 | End: 2019-03-06

## 2018-09-07 RX ORDER — PRAMIPEXOLE DIHYDROCHLORIDE 1 MG/1
TABLET ORAL
Refills: 2 | COMMUNITY
Start: 2018-08-16

## 2018-09-07 RX ORDER — PROPRANOLOL HYDROCHLORIDE 20 MG/1
20 TABLET ORAL 3 TIMES DAILY PRN
Refills: 2 | COMMUNITY
Start: 2018-08-15

## 2018-09-07 RX ORDER — CEPHALEXIN 500 MG/1
500 CAPSULE ORAL 3 TIMES DAILY
Refills: 1 | COMMUNITY
Start: 2018-08-31 | End: 2019-01-27 | Stop reason: HOSPADM

## 2018-09-07 RX ORDER — IBUPROFEN 800 MG/1
TABLET ORAL
COMMUNITY
End: 2019-03-06

## 2018-09-07 RX ORDER — DULOXETIN HYDROCHLORIDE 30 MG/1
CAPSULE, DELAYED RELEASE ORAL
COMMUNITY
End: 2019-01-27 | Stop reason: HOSPADM

## 2018-09-07 RX ORDER — QUETIAPINE FUMARATE 25 MG/1
TABLET, FILM COATED ORAL
COMMUNITY
End: 2019-01-27 | Stop reason: HOSPADM

## 2018-09-07 RX ORDER — ORPHENADRINE CITRATE 30 MG/ML
2 INJECTION INTRAMUSCULAR; INTRAVENOUS EVERY 12 HOURS
COMMUNITY
End: 2019-01-27 | Stop reason: HOSPADM

## 2018-09-07 NOTE — PROGRESS NOTES
Subjective   Patient ID: Baldo Purcell is a 42 y.o. female  Fracture and Pain of the Left Foot (Patient reports 8/10 foot pain that began on 8/31/18 after she tripped in her home. Patient denies head trauma and LOC. She presented to [facility] ER on 9/5/18 for workup. )             History of Present Illness  She is status post 3 level ACDF done in [place] on 8:30 actually H tripped and fell down stairs and injured her left foot seen in the ED found to have a fracture at the base of the fourth metatarsal and avulsion fracture of the base of the first metatarsal.  Was given a boot and crutches and extensive evaluation of her neck and thoracic spine and knee and leg and only fracture diagnosis was in her left foot.  She's had no change in her neck symptoms since surgery she does have hoarseness was seen to have a small hematoma on the CT of her cervical spine.  She has contacted her surgeon's office in [place] who did cervical surgery and has arranged follow-up.     she was able to walk on her left foot full weightbearing even before she got to the emergency department and since then has had very little pain in the foot she says she has more pain in her cervical spine.              Review of Systems   Constitutional: Negative for diaphoresis, fever and unexpected weight change.   HENT: Negative for dental problem and sore throat.    Eyes: Negative for visual disturbance.   Respiratory: Negative for shortness of breath.    Cardiovascular: Negative for chest pain.   Gastrointestinal: Negative for abdominal pain, constipation, diarrhea, nausea and vomiting.   Genitourinary: Negative for difficulty urinating and frequency.   Neurological: Negative for headaches.   Hematological: Does not bruise/bleed easily.   All other systems reviewed and are negative.      Past Medical History:   Diagnosis Date   • Anxiety    • Arthritis    • Bursitis    • Depression    • Diverticula, colon    • Fibromyalgia    • GERD  (gastroesophageal reflux disease)    • Hypertension    • Lumbosacral disc disease    • Migraine    • Osteoarthritis         Past Surgical History:   Procedure Laterality Date   • CERVICAL DISC SURGERY     • CHOLECYSTECTOMY     • HYSTERECTOMY     • KNEE ARTHROSCOPY     • KNEE ARTHROSCOPY Right    • TUBAL ABDOMINAL LIGATION     • TUMOR EXCISION         Family History   Problem Relation Age of Onset   • Stroke Father    • Diabetes Father    • Diabetes Paternal Grandmother    • Osteoarthritis Other    • Hypertension Other    • Angina Other    • Diabetes Other    • Stroke Other    • Hyperlipidemia Other    • Glaucoma Other        Social History     Social History   • Marital status:      Spouse name: N/A   • Number of children: N/A   • Years of education: N/A     Occupational History   • Not on file.     Social History Main Topics   • Smoking status: Never Smoker   • Smokeless tobacco: Not on file   • Alcohol use No   • Drug use: No   • Sexual activity: Defer     Other Topics Concern   • Not on file     Social History Narrative   • No narrative on file       I have reviewed all of the above social hx, family hx, surgical hx, medications, allergies & ROS and confirm that it is accurate.    Allergies   Allergen Reactions   • Lortab [Hydrocodone-Acetaminophen] Hives   • Mobic [Meloxicam] Hives   • Pregabalin Hives   • Cymbalta [Duloxetine Hcl] Irritability         Current Outpatient Prescriptions:   •  amitriptyline (ELAVIL) 10 MG tablet, , Disp: , Rfl:   •  baclofen (LIORESAL) 10 MG tablet, Take 10 mg by mouth Every Night., Disp: , Rfl:   •  cephalexin (KEFLEX) 500 MG capsule, Take 500 mg by mouth 3 (Three) Times a Day., Disp: , Rfl: 1  •  DULoxetine (CYMBALTA) 30 MG capsule, duloxetine 30 mg capsule,delayed release, Disp: , Rfl:   •  gabapentin (NEURONTIN) 800 MG tablet, , Disp: , Rfl:   •  hydrochlorothiazide (HYDRODIURIL) 25 MG tablet, Take 20 mg by mouth Daily., Disp: , Rfl:   •  ibuprofen (ADVIL,MOTRIN) 800 MG  "tablet, ibuprofen 800 mg tablet, Disp: , Rfl:   •  lisinopril (PRINIVIL,ZESTRIL) 5 MG tablet, Take 5 mg by mouth Daily., Disp: , Rfl:   •  Magnesium Oxide 400 (240 Mg) MG tablet, , Disp: , Rfl:   •  omeprazole (priLOSEC) 20 MG capsule, Take 20 mg by mouth 2 (Two) Times a Day., Disp: , Rfl: 2  •  orphenadrine (NORFLEX) 30 MG/ML injection, 2 mL Every 12 (Twelve) Hours., Disp: , Rfl:   •  oxyCODONE-acetaminophen (PERCOCET) 5-325 MG per tablet, Take 1 tablet by mouth Every 4 (Four) Hours As Needed for Severe Pain ., Disp: 12 tablet, Rfl: 0  •  pramipexole (MIRAPEX) 0.5 MG tablet, Take 0.5 mg by mouth 3 (Three) Times a Day., Disp: , Rfl: 2  •  pramipexole (MIRAPEX) 1 MG tablet, TAKE ONE TABLET BY MOUTH THREE TIMES A DAY EVERY DAY, Disp: , Rfl: 2  •  propranolol (INDERAL) 20 MG tablet, Take 20 mg by mouth 3 (Three) Times a Day As Needed., Disp: , Rfl: 2  •  QUEtiapine (SEROquel) 25 MG tablet, quetiapine 25 mg tablet, Disp: , Rfl:   •  SUMAtriptan (IMITREX) 100 MG tablet, , Disp: , Rfl:     Objective   Resp 18   Ht 171.5 cm (67.5\")   Wt 79.8 kg (176 lb)   BMI 27.16 kg/m²    Physical Exam  Constitutional: Patient is oriented to person, place, and time. Patient appears well-developed and well-nourished.   HENT:Head: Normocephalic and atraumatic.   Eyes: EOM are normal. Pupils are equal, round, and reactive to light.   Neck: Normal range of motion. Neck supple.   Cardiovascular: Normal rate.    Pulmonary/Chest: Effort normal and breath sounds normal.   Abdominal: Soft.   Neurological: Patient is alert and oriented to person, place, and time.   Skin: Skin is warm and dry.   Psychiatric: Patient has a normal mood and affect.   Nursing note and vitals reviewed.       Ortho Exam   Left foot with some tenderness at the dorsal medial aspect of the base of the first metatarsal also some tenderness at the base of the fourth metatarsal no tenderness at the medial Lisfranc area for foot neurovascularly intact despite the moderate " swelling and ecchymosis, gross neurovascular status is intact ankle is nontender with full range of motion.    Assessment/Plan   Review of Radiographic Studies:    No new imaging done today.      Procedures     Baldo was seen today for fracture and pain.    Diagnoses and all orders for this visit:    Closed nondisplaced fracture of first metatarsal bone of left foot, initial encounter       Orthopedic activities reviewed and patient expressed appreciation and Risk, benefits, and merits of treatment alternatives reviewed with the patient and questions answered      Recommendations/Plan:   Work/Activity Status: Cam Walker weight-bear as tolerated    Patient agreeable to call or return sooner for any concerns.             Impression:  Base of fourth metatarsal fracture left foot, avulsion fracture base of first metatarsal left foot, history of multilevel cervical ACDF 8/30  Plan:  She will follow up in [place] regarding her cervical spine follow-up, weight-bear as tolerated, return in 6 weeks x-rays left foot, avoid use of anti-inflammatories during her postop ACDF interval

## 2018-09-12 ENCOUNTER — TELEPHONE (OUTPATIENT)
Dept: ORTHOPEDIC SURGERY | Facility: CLINIC | Age: 43
End: 2018-09-12

## 2018-09-12 NOTE — TELEPHONE ENCOUNTER
She should keep max elevated, stay off her foot,  add tylenol to her otc nsaids, use ice-- no narcotics--she is allergic anyway to norco--

## 2018-09-27 ENCOUNTER — TELEPHONE (OUTPATIENT)
Dept: ORTHOPEDIC SURGERY | Facility: CLINIC | Age: 43
End: 2018-09-27

## 2019-01-27 ENCOUNTER — APPOINTMENT (OUTPATIENT)
Dept: GENERAL RADIOLOGY | Facility: HOSPITAL | Age: 44
End: 2019-01-27

## 2019-01-27 ENCOUNTER — HOSPITAL ENCOUNTER (EMERGENCY)
Facility: HOSPITAL | Age: 44
Discharge: HOME | End: 2019-01-27
Attending: STUDENT IN AN ORGANIZED HEALTH CARE EDUCATION/TRAINING PROGRAM | Admitting: STUDENT IN AN ORGANIZED HEALTH CARE EDUCATION/TRAINING PROGRAM

## 2019-01-27 VITALS
OXYGEN SATURATION: 100 % | SYSTOLIC BLOOD PRESSURE: 140 MMHG | BODY MASS INDEX: 29.6 KG/M2 | HEIGHT: 67 IN | DIASTOLIC BLOOD PRESSURE: 98 MMHG | HEART RATE: 81 BPM | RESPIRATION RATE: 18 BRPM | TEMPERATURE: 98.2 F | WEIGHT: 188.6 LBS

## 2019-01-27 DIAGNOSIS — S60.221A CONTUSION OF RIGHT HAND, INITIAL ENCOUNTER: Primary | ICD-10-CM

## 2019-01-27 PROCEDURE — 73130 X-RAY EXAM OF HAND: CPT

## 2019-01-27 PROCEDURE — 99283 EMERGENCY DEPT VISIT LOW MDM: CPT

## 2019-01-27 RX ORDER — OXYCODONE HYDROCHLORIDE AND ACETAMINOPHEN 5; 325 MG/1; MG/1
1 TABLET ORAL ONCE
Status: COMPLETED | OUTPATIENT
Start: 2019-01-27 | End: 2019-01-27

## 2019-01-27 RX ORDER — CARISOPRODOL 250 MG/1
1 TABLET ORAL 3 TIMES DAILY PRN
COMMUNITY

## 2019-01-27 RX ORDER — ONDANSETRON 4 MG/1
4 TABLET, ORALLY DISINTEGRATING ORAL ONCE
Status: COMPLETED | OUTPATIENT
Start: 2019-01-27 | End: 2019-01-27

## 2019-01-27 RX ADMIN — OXYCODONE HYDROCHLORIDE AND ACETAMINOPHEN 1 TABLET: 5; 325 TABLET ORAL at 20:33

## 2019-01-27 RX ADMIN — ONDANSETRON 4 MG: 4 TABLET, ORALLY DISINTEGRATING ORAL at 20:33

## 2019-02-22 RX ORDER — MIRTAZAPINE 15 MG/1
TABLET, FILM COATED ORAL
Qty: 30 TABLET | OUTPATIENT
Start: 2019-02-22

## 2019-03-06 ENCOUNTER — OFFICE VISIT (OUTPATIENT)
Dept: NEUROLOGY | Facility: CLINIC | Age: 44
End: 2019-03-06

## 2019-03-06 VITALS
SYSTOLIC BLOOD PRESSURE: 138 MMHG | OXYGEN SATURATION: 99 % | DIASTOLIC BLOOD PRESSURE: 90 MMHG | BODY MASS INDEX: 29.51 KG/M2 | HEART RATE: 110 BPM | WEIGHT: 188 LBS | HEIGHT: 67 IN

## 2019-03-06 DIAGNOSIS — M50.30 BULGING OF CERVICAL INTERVERTEBRAL DISC: Primary | ICD-10-CM

## 2019-03-06 DIAGNOSIS — G25.0 BENIGN ESSENTIAL TREMOR: ICD-10-CM

## 2019-03-06 PROCEDURE — 99214 OFFICE O/P EST MOD 30 MIN: CPT | Performed by: NURSE PRACTITIONER

## 2019-03-06 RX ORDER — FLUOXETINE 10 MG/1
10 TABLET, FILM COATED ORAL DAILY
Refills: 1 | COMMUNITY
Start: 2019-03-04

## 2019-03-06 RX ORDER — CYCLOBENZAPRINE HCL 10 MG
10 TABLET ORAL EVERY 8 HOURS PRN
Refills: 0 | COMMUNITY
Start: 2019-03-04

## 2019-03-06 RX ORDER — DIAZEPAM 10 MG/1
10 TABLET ORAL 2 TIMES DAILY
Refills: 0 | COMMUNITY
Start: 2019-03-04

## 2019-03-06 RX ORDER — TRAZODONE HYDROCHLORIDE 50 MG/1
TABLET ORAL
Refills: 1 | COMMUNITY
Start: 2019-03-04

## 2019-03-06 RX ORDER — PRIMIDONE 50 MG/1
50 TABLET ORAL NIGHTLY
Qty: 30 TABLET | Refills: 3 | Status: SHIPPED | OUTPATIENT
Start: 2019-03-06 | End: 2020-03-05

## 2019-03-06 RX ORDER — HYDROCODONE BITARTRATE AND ACETAMINOPHEN 7.5; 325 MG/1; MG/1
1 TABLET ORAL EVERY 8 HOURS
Refills: 0 | COMMUNITY
Start: 2019-03-04

## 2019-03-06 RX ORDER — MIRTAZAPINE 15 MG/1
15 TABLET, FILM COATED ORAL
Refills: 2 | COMMUNITY
Start: 2019-01-30

## 2019-03-06 RX ORDER — PRAZOSIN HYDROCHLORIDE 1 MG/1
1 CAPSULE ORAL
Refills: 1 | COMMUNITY
Start: 2019-03-04

## 2019-03-06 RX ORDER — METHYLPREDNISOLONE 4 MG/1
1 TABLET ORAL
Refills: 0 | COMMUNITY
Start: 2019-03-04 | End: 2019-03-06

## 2019-03-06 NOTE — PROGRESS NOTES
Subjective   Baldo Purcell is a 43 y.o. female     Chief Complaint   Patient presents with   • Establish Care     patient also reports weakness in hands. states it gets worse with stress   • Tremors   • Balance Issues   • Memory Loss   • Pain     with stiffness    • Fibromyalgia       History of Present Illness   Np patient in clinic to [facility] neurology for tremors. States total body tremors result with sickness or stress. Occurs daily constantly unless laying down or resting tremors lessen. Difficulty eating, States has had tremors since childhood. . States worsened with age. Hx fibromyalgia with total body pain.       The following portions of the patient's history were reviewed and updated as appropriate: allergies, current medications, past family history, past medical history, past social history, past surgical history and problem list.    Review of Systems   Constitutional: Negative for chills and fever.   HENT: Negative for congestion, ear pain, hearing loss, rhinorrhea and sore throat.    Eyes: Negative for pain, discharge and redness.   Respiratory: Negative for cough, shortness of breath, wheezing and stridor.    Cardiovascular: Negative for chest pain, palpitations and leg swelling.   Gastrointestinal: Negative for abdominal pain, constipation, nausea and vomiting.   Endocrine: Negative for cold intolerance, heat intolerance and polyphagia.   Genitourinary: Negative for dysuria, flank pain, frequency and urgency.   Musculoskeletal: Positive for arthralgias and myalgias. Negative for joint swelling, neck pain and neck stiffness.   Skin: Negative for pallor, rash and wound.   Allergic/Immunologic: Negative for environmental allergies.   Neurological: Positive for tremors. Negative for dizziness, seizures, syncope, facial asymmetry, speech difficulty, weakness, light-headedness, numbness and headaches.   Hematological: Negative for adenopathy.   Psychiatric/Behavioral: Negative for confusion  "and hallucinations. The patient is nervous/anxious.        Objective       Vitals:    03/06/19 1511   BP: 138/90   Pulse: 110   SpO2: 99%   Weight: 85.3 kg (188 lb)   Height: 170.2 cm (67\")     GENERAL: Patient is pleasant, cooperative, appears to be stated age.   HEAD:  Head is normocephalic and atraumatic.    NECK: Neck are non-tender without thyromegaly or adenopathy.  Carotic upstrokes are 1+/4.  No cranial or cervical bruits.  The neck is supple with a full range of motion.   CARDIOVASCULAR:  Regular rate and rhythm with normal S1 and S2 without rub or gallop.  RESPIRATORY:  Clear to auscultation without wheezes or crackle   PSYCH:  Higher cortical function/mental status:  The patient is alert.  She  is oriented x3 to time, place and person.  Recent and the remote memory appear normal.  The patient has a good fund of knowledge.  There is no visual or auditory hallucination or suicidal or homicidal ideation.  SPEECH:There is no gross evidence of aphasia, dysarthria or agnosia.      CRANIAL NERVES: Extraocular movements are full and smooth with normal pursuits and saccades.  No nystagmus noted.  The face is symmetric. Tongue midline.   MOTOR: Strength is 5/5 throughout with normal tone and bulk  + involuntary movements noted.  Bilateral upper hand tremors with activity and muscle exhaustion.  DTR: are 2/4 and symmetrical in the upper extremities, 2/4 and symmetrical at the knees   COORDINATION AND GAIT:  The patient walks with a narrow-based gait.  Romberg and monopedal  Romberg are negative.  The patient normally performs \ rapid alternating movements with lag slowness secondary to the tremors worsening.      Results for orders placed or performed in visit on 07/31/18   Comprehensive Metabolic Panel   Result Value Ref Range    Glucose 102 (H) 74 - 98 mg/dL    BUN 11 7 - 20 mg/dL    Creatinine 0.70 0.60 - 1.30 mg/dL    Sodium 138 137 - 145 mmol/L    Potassium 4.1 3.5 - 5.1 mmol/L    Chloride 103 98 - 107 mmol/L "    CO2 26.0 26.0 - 30.0 mmol/L    Calcium 9.2 8.4 - 10.2 mg/dL    Total Protein 7.0 6.3 - 8.2 g/dL    Albumin 4.10 3.50 - 5.00 g/dL    ALT (SGPT) 35 13 - 69 U/L    AST (SGOT) 27 15 - 46 U/L    Alkaline Phosphatase 82 38 - 126 U/L    Total Bilirubin 0.8 0.2 - 1.3 mg/dL    eGFR  African Amer 112 >60 mL/min/1.73    Globulin 2.9 gm/dL    A/G Ratio 1.4 1.0 - 2.0 g/dL    BUN/Creatinine Ratio 15.7 7.1 - 23.5    Anion Gap 13.1 10.0 - 20.0 mmol/L   TSH   Result Value Ref Range    TSH 1.460 0.470 - 4.680 mIU/mL   Lipid Panel   Result Value Ref Range    Total Cholesterol 190 0 - 199 mg/dL    Triglycerides 115 <150 mg/dL    HDL Cholesterol 50 40 - 60 mg/dL    LDL Cholesterol  117 (H) 0 - 99 mg/dL    VLDL Cholesterol 23 mg/dL    LDL/HDL Ratio 2.34    CBC Auto Differential   Result Value Ref Range    WBC 6.80 4.80 - 10.80 10*3/mm3    RBC 4.98 4.20 - 5.40 10*6/mm3    Hemoglobin 14.9 12.0 - 16.0 g/dL    Hematocrit 44.2 37.0 - 47.0 %    MCV 88.8 81.0 - 99.0 fL    MCH 29.9 27.0 - 31.0 pg    MCHC 33.7 30.0 - 37.0 g/dL    RDW 12.7 11.5 - 14.5 %    RDW-SD 41.3 37.0 - 54.0 fl    MPV 9.1 6.0 - 12.0 fL    Platelets 266 130 - 400 10*3/mm3    Neutrophil % 70.7 37.0 - 80.0 %    Lymphocyte % 22.4 10.0 - 50.0 %    Monocyte % 5.6 0.0 - 12.0 %    Eosinophil % 0.4 0.0 - 7.0 %    Basophil % 0.6 0.0 - 2.5 %    Immature Grans % 0.3 0.0 - 0.6 %    Neutrophils, Absolute 4.81 2.00 - 6.90 10*3/mm3    Lymphocytes, Absolute 1.52 0.60 - 3.40 10*3/mm3    Monocytes, Absolute 0.38 0.00 - 0.90 10*3/mm3    Eosinophils, Absolute 0.03 0.00 - 0.70 10*3/mm3    Basophils, Absolute 0.04 0.00 - 0.20 10*3/mm3    Immature Grans, Absolute 0.02 0.00 - 0.06 10*3/mm3    nRBC 0.0 0.0 - 0.0 /100 WBC       I have personally reviewed the above labs. Pt follows with PCP.    Assessment/Plan   1.  Essential tremor: Patient will be started on primidone.  We have discussed in detail what essential tremor is and how we will help with medications.  We will have patient return to clinic  in 12 weeks to follow-up.

## 2019-03-08 RX ORDER — HYDROCHLOROTHIAZIDE 25 MG/1
TABLET ORAL
Qty: 30 TABLET | OUTPATIENT
Start: 2019-03-08

## 2019-03-08 RX ORDER — LISINOPRIL 2.5 MG/1
TABLET ORAL
Qty: 30 TABLET | OUTPATIENT
Start: 2019-03-08

## 2019-03-08 RX ORDER — PRAMIPEXOLE DIHYDROCHLORIDE 1.5 MG/1
TABLET ORAL
Qty: 90 TABLET | OUTPATIENT
Start: 2019-03-08

## 2019-03-08 RX ORDER — PROPRANOLOL HYDROCHLORIDE 80 MG/1
CAPSULE, EXTENDED RELEASE ORAL
Qty: 30 CAPSULE | OUTPATIENT
Start: 2019-03-08

## 2019-03-18 PROBLEM — G25.0 BENIGN ESSENTIAL TREMOR: Status: ACTIVE | Noted: 2019-03-18

## 2019-03-18 RX ORDER — LISINOPRIL 2.5 MG/1
TABLET ORAL
Qty: 30 TABLET | OUTPATIENT
Start: 2019-03-18

## 2019-03-18 RX ORDER — PRAMIPEXOLE DIHYDROCHLORIDE 1.5 MG/1
TABLET ORAL
Qty: 90 TABLET | OUTPATIENT
Start: 2019-03-18

## 2019-03-18 RX ORDER — HYDROCHLOROTHIAZIDE 25 MG/1
TABLET ORAL
Qty: 30 TABLET | OUTPATIENT
Start: 2019-03-18

## 2019-03-18 RX ORDER — PROPRANOLOL HYDROCHLORIDE 80 MG/1
CAPSULE, EXTENDED RELEASE ORAL
Qty: 30 CAPSULE | OUTPATIENT
Start: 2019-03-18

## 2019-03-23 RX ORDER — SUMATRIPTAN 100 MG/1
TABLET, FILM COATED ORAL
Qty: 9 TABLET | OUTPATIENT
Start: 2019-03-23

## 2019-04-30 RX ORDER — MIRTAZAPINE 15 MG/1
TABLET, FILM COATED ORAL
Qty: 30 TABLET | OUTPATIENT
Start: 2019-04-30

## 2019-05-28 RX ORDER — ESTRADIOL 1 MG/1
TABLET ORAL
Qty: 21 TABLET | OUTPATIENT
Start: 2019-05-28

## 2019-06-20 RX ORDER — HYDROCHLOROTHIAZIDE 25 MG/1
TABLET ORAL
Qty: 30 TABLET | OUTPATIENT
Start: 2019-06-20

## 2019-06-20 RX ORDER — PRIMIDONE 50 MG/1
TABLET ORAL
Qty: 30 TABLET | Refills: 3 | OUTPATIENT
Start: 2019-06-20

## 2019-06-20 RX ORDER — PROPRANOLOL HCL 60 MG
CAPSULE, EXTENDED RELEASE 24HR ORAL
Qty: 60 CAPSULE | OUTPATIENT
Start: 2019-06-20

## 2019-06-27 RX ORDER — SUMATRIPTAN 100 MG/1
TABLET, FILM COATED ORAL
Qty: 9 TABLET | OUTPATIENT
Start: 2019-06-27

## 2022-06-27 ENCOUNTER — TELEPHONE (OUTPATIENT)
Dept: SURGERY | Facility: CLINIC | Age: 47
End: 2022-06-27

## 2023-12-06 ENCOUNTER — TRANSCRIBE ORDERS (OUTPATIENT)
Dept: ADMINISTRATIVE | Facility: HOSPITAL | Age: 48
End: 2023-12-06
Payer: MEDICAID

## 2023-12-06 DIAGNOSIS — Z12.31 SCREENING MAMMOGRAM, ENCOUNTER FOR: Primary | ICD-10-CM

## 2025-01-01 ENCOUNTER — HOSPITAL ENCOUNTER (EMERGENCY)
Facility: HOSPITAL | Age: 50
End: 2025-04-02
Attending: STUDENT IN AN ORGANIZED HEALTH CARE EDUCATION/TRAINING PROGRAM
Payer: MEDICAID

## 2025-01-01 VITALS — TEMPERATURE: 96.5 F

## 2025-01-01 DIAGNOSIS — I46.9 CARDIAC ARREST: Primary | ICD-10-CM

## 2025-01-01 PROCEDURE — 25010000002 EPINEPHRINE 1 MG/10ML SOLUTION PREFILLED SYRINGE: Performed by: STUDENT IN AN ORGANIZED HEALTH CARE EDUCATION/TRAINING PROGRAM

## 2025-01-01 PROCEDURE — 99285 EMERGENCY DEPT VISIT HI MDM: CPT | Performed by: STUDENT IN AN ORGANIZED HEALTH CARE EDUCATION/TRAINING PROGRAM

## 2025-01-01 PROCEDURE — 93005 ELECTROCARDIOGRAM TRACING: CPT | Performed by: STUDENT IN AN ORGANIZED HEALTH CARE EDUCATION/TRAINING PROGRAM

## 2025-01-01 PROCEDURE — 25010000002 NALOXONE HCL 2 MG/2ML SOLUTION PREFILLED SYRINGE: Performed by: STUDENT IN AN ORGANIZED HEALTH CARE EDUCATION/TRAINING PROGRAM

## 2025-01-01 PROCEDURE — 92950 HEART/LUNG RESUSCITATION CPR: CPT

## 2025-01-01 RX ORDER — NALOXONE HYDROCHLORIDE 1 MG/ML
INJECTION INTRAMUSCULAR; INTRAVENOUS; SUBCUTANEOUS
Status: COMPLETED | OUTPATIENT
Start: 2025-01-01 | End: 2025-01-01

## 2025-01-01 RX ADMIN — NALOXONE HYDROCHLORIDE 2 MG: 1 INJECTION PARENTERAL at 12:46

## 2025-01-01 RX ADMIN — EPINEPHRINE 1 MG: 0.1 INJECTION INTRAVENOUS at 12:47

## 2025-04-02 NOTE — SIGNIFICANT NOTE
Amor Altamirano, [facility]oner, spoke with the patient's family who declined donation. Jeri Argueta with [facility] notified at [phone] of family's decision.

## 2025-04-02 NOTE — ED PROVIDER NOTES
Subjective:  History of Present Illness:    Patient is a 49-year-old female with unknown medical history on presentation who presents today in cardiac arrest.  Reportedly found down by a family member in bed.  Reports that patient had airway gurgling.  Unresponsive.  On EMS arrival patient had no pulse.  ACLS was initiated.  Patient in asystole for the duration of their resuscitation.  4 rounds of epinephrine prior to arrival.  Now presents back to our emergency department.  Further history unknown be obtained secondary to the patient's critical status.      Nurses Notes reviewed and agree, including vitals, allergies, social history and prior medical history.     REVIEW OF SYSTEMS: All systems reviewed and not pertinent unless noted.  Review of Systems   Unable to perform ROS: Acuity of condition       Past Medical History:   Diagnosis Date    Anxiety     Arthritis     Bursitis     Depression     Diverticula, colon     Fibromyalgia     GERD (gastroesophageal reflux disease)     Hypertension     Lumbosacral disc disease     Migraine     Osteoarthritis        Allergies:    Lortab [hydrocodone-acetaminophen], Mobic [meloxicam], Codeine, Pregabalin, Tramadol, and Cymbalta [duloxetine hcl]      Past Surgical History:   Procedure Laterality Date    CERVICAL DISC SURGERY      CHOLECYSTECTOMY      HYSTERECTOMY      KNEE ARTHROSCOPY      KNEE ARTHROSCOPY Right     TUBAL ABDOMINAL LIGATION      TUMOR EXCISION           Social History     Socioeconomic History    Marital status:    Tobacco Use    Smoking status: Never   Substance and Sexual Activity    Alcohol use: No    Drug use: No    Sexual activity: Defer         Family History   Problem Relation Age of Onset    Stroke Father     Diabetes Father     Diabetes Paternal Grandmother     Osteoarthritis Other     Hypertension Other     Angina Other     Diabetes Other     Stroke Other     Hyperlipidemia Other     Glaucoma Other        Objective  Physical Exam:  There  were no vitals taken for this visit.     Physical Exam  Constitutional:       Appearance: She is obese. She is ill-appearing and toxic-appearing.      Comments: Arrives pulseless and apneic, pale on exam   HENT:      Head: Atraumatic.      Nose: Nose normal.      Mouth/Throat:      Mouth: Mucous membranes are dry.      Pharynx: Oropharynx is clear.   Eyes:      Comments: Eyes fixed and nonreactive   Cardiovascular:      Comments: Pulseless to auscultation, no pulse to palpation of the wrist  Pulmonary:      Comments: Mechanically ventilated, clear breath sounds bilateral  Skin:     Coloration: Skin is pale.   Neurological:      Comments: Unreactive, eyes fixed and dilated   Psychiatric:      Comments: Unable to assess given the patient's altered mental status         Critical Care    Performed by: Jonathan Michelle MD  Authorized by: Jonathan Michelle MD    Critical care provider statement:     Critical care time (minutes):  10    Critical care time was exclusive of:  Separately billable procedures and treating other patients    Critical care was necessary to treat or prevent imminent or life-threatening deterioration of the following conditions:  Cardiac failure (Cardiac arrest)    Critical care was time spent personally by me on the following activities:  Development of treatment plan with patient or surrogate, blood draw for specimens, evaluation of patient's response to treatment, examination of patient, obtaining history from patient or surrogate, ordering and performing treatments and interventions, pulse oximetry and re-evaluation of patient's condition      ED Course:    ED Course as of 04/02/25 1353   Wed Apr 02, 2025   1315 EKG interpreted by me, asystole, abnormal EKG [JE]      ED Course User Index  [JE] Jonathan Michelle MD       Lab Results (last 24 hours)       ** No results found for the last 24 hours. **             No radiology results from the last 24 hrs       MDM      Initial impression  of presenting illness: Rest    DDX: includes but is not limited to: Opiate overdose, respiratory arrest, cardiac arrhythmia    Patient arrives critical with vitals interpreted by myself.     Pertinent features from physical exam: Pulseless and apneic on arrival, eyes fixed and dilated.    Initial diagnostic plan: Blood glucose    Results from initial plan were reviewed and interpreted by me revealing patient's glucose is elevated, end-tidal appropriate, patient remains in asystole    Diagnostic information from other sources: Discussed with EMS on arrival    Interventions / Re-evaluation: Given round of epinephrine from ACLS protocol as well as 2 mg of Narcan with no response, patient appropriately intubated moving good air, end-tidal CO2 on arrival.  Asystole on the monitor    Results/clinical rationale were discussed with patient's family on arrival    Consultations/Discussion of results with other physicians: Patient arrives after 40 minutes of ACLS protocol.  In asystole on arrival.  Patient has remained in asystole.  Given time course of workup as well as now 5 rounds of ACLS protocol decision made to terminate efforts.  Patient's  arrived to the emergency department, original report of cardiac arrest.  States that he had not spoken to the patient since 3 AM.  Unclear time course of arrest.   notified    Disposition plan:   -----        Final diagnoses:   Cardiac arrest          Jonathan Michelle MD  [id number]